# Patient Record
Sex: MALE | Race: WHITE | Employment: OTHER | URBAN - NONMETROPOLITAN AREA
[De-identification: names, ages, dates, MRNs, and addresses within clinical notes are randomized per-mention and may not be internally consistent; named-entity substitution may affect disease eponyms.]

---

## 2017-02-17 RX ORDER — FUROSEMIDE 40 MG/1
TABLET ORAL
Qty: 100 TABLET | Refills: 11 | Status: SHIPPED | OUTPATIENT
Start: 2017-02-17 | End: 2018-02-20 | Stop reason: SDUPTHER

## 2017-12-15 LAB
BASOPHILS ABSOLUTE: ABNORMAL /ΜL
BASOPHILS RELATIVE PERCENT: ABNORMAL %
BUN BLDV-MCNC: 28 MG/DL
CALCIUM SERPL-MCNC: 8.6 MG/DL
CHLORIDE BLD-SCNC: 104 MMOL/L
CHOLESTEROL, TOTAL: 158 MG/DL
CHOLESTEROL/HDL RATIO: NORMAL
CO2: 30 MMOL/L
CREAT SERPL-MCNC: 1.4 MG/DL
EOSINOPHILS ABSOLUTE: ABNORMAL /ΜL
EOSINOPHILS RELATIVE PERCENT: ABNORMAL %
GFR CALCULATED: 52
GLUCOSE BLD-MCNC: 101 MG/DL
HCT VFR BLD CALC: 41.5 % (ref 41–53)
HDLC SERPL-MCNC: 47 MG/DL (ref 35–70)
HEMOGLOBIN: 13.3 G/DL (ref 13.5–17.5)
LDL CHOLESTEROL CALCULATED: 90 MG/DL (ref 0–160)
LYMPHOCYTES ABSOLUTE: ABNORMAL /ΜL
LYMPHOCYTES RELATIVE PERCENT: ABNORMAL %
MCH RBC QN AUTO: ABNORMAL PG
MCHC RBC AUTO-ENTMCNC: ABNORMAL G/DL
MCV RBC AUTO: ABNORMAL FL
MONOCYTES ABSOLUTE: ABNORMAL /ΜL
MONOCYTES RELATIVE PERCENT: ABNORMAL %
NEUTROPHILS ABSOLUTE: ABNORMAL /ΜL
NEUTROPHILS RELATIVE PERCENT: ABNORMAL %
PLATELET # BLD: 260 K/ΜL
PMV BLD AUTO: ABNORMAL FL
POTASSIUM SERPL-SCNC: 4.6 MMOL/L
RBC # BLD: 4.81 10^6/ΜL
SODIUM BLD-SCNC: 138 MMOL/L
TRIGL SERPL-MCNC: 109 MG/DL
VLDLC SERPL CALC-MCNC: 22 MG/DL
WBC # BLD: 7.1 10^3/ML

## 2017-12-26 ENCOUNTER — OFFICE VISIT (OUTPATIENT)
Dept: NEPHROLOGY | Age: 77
End: 2017-12-26
Payer: MEDICARE

## 2017-12-26 VITALS
WEIGHT: 292 LBS | HEART RATE: 48 BPM | HEIGHT: 68 IN | OXYGEN SATURATION: 96 % | BODY MASS INDEX: 44.25 KG/M2 | DIASTOLIC BLOOD PRESSURE: 74 MMHG | RESPIRATION RATE: 18 BRPM | SYSTOLIC BLOOD PRESSURE: 128 MMHG

## 2017-12-26 DIAGNOSIS — N18.30 CKD (CHRONIC KIDNEY DISEASE), STAGE III (HCC): Primary | ICD-10-CM

## 2017-12-26 PROCEDURE — 99213 OFFICE O/P EST LOW 20 MIN: CPT | Performed by: INTERNAL MEDICINE

## 2017-12-26 PROCEDURE — G8484 FLU IMMUNIZE NO ADMIN: HCPCS | Performed by: INTERNAL MEDICINE

## 2017-12-26 PROCEDURE — 1036F TOBACCO NON-USER: CPT | Performed by: INTERNAL MEDICINE

## 2017-12-26 PROCEDURE — 4040F PNEUMOC VAC/ADMIN/RCVD: CPT | Performed by: INTERNAL MEDICINE

## 2017-12-26 PROCEDURE — G8427 DOCREV CUR MEDS BY ELIG CLIN: HCPCS | Performed by: INTERNAL MEDICINE

## 2017-12-26 PROCEDURE — 1123F ACP DISCUSS/DSCN MKR DOCD: CPT | Performed by: INTERNAL MEDICINE

## 2017-12-26 PROCEDURE — G8419 CALC BMI OUT NRM PARAM NOF/U: HCPCS | Performed by: INTERNAL MEDICINE

## 2017-12-26 RX ORDER — QUINAPRIL 40 MG/1
40 TABLET ORAL 2 TIMES DAILY
COMMUNITY

## 2017-12-26 RX ORDER — ATENOLOL 50 MG/1
25 TABLET ORAL DAILY
COMMUNITY

## 2017-12-26 NOTE — PROGRESS NOTES
12/15/2017     BMP:    Lab Results   Component Value Date     12/15/2017     11/28/2016     06/01/2016    K 4.6 12/15/2017    K 4.7 11/28/2016    K 4.2 06/01/2016     12/15/2017     11/28/2016     06/01/2016    CO2 30 12/15/2017    CO2 31 11/28/2016    CO2 31 06/01/2016    BUN 28 12/15/2017    BUN 21 11/28/2016    BUN 24 06/01/2016    CREATININE 1.4 12/15/2017    CREATININE 1.3 11/28/2016    CREATININE 1.3 06/01/2016    GLUCOSE 101 12/15/2017    GLUCOSE 105 11/28/2016    GLUCOSE 104 06/01/2016      Hepatic: No results found for: AST, ALT, ALB, BILITOT, ALKPHOS  BNP: No results found for: BNP  Lipids:   Lab Results   Component Value Date    CHOL 158 12/15/2017    HDL 47 12/15/2017     INR: No results found for: INR  URINE: No results found for: Zoila Guess  Lab Results   Component Value Date    NITRU Negative 12/04/2014    COLORU Cornelia 12/04/2014    PHUR 5.0 12/04/2014    RBCUA 0 12/04/2014    YEAST 0 12/04/2014    BACTERIA 0 12/04/2014    CLARITYU Clear 12/04/2014    LEUKOCYTESUR neg 12/04/2014    BLOODU Negative 12/04/2014    GLUCOSEU neg 12/04/2014    KETUA Negative 12/04/2014      Microalbumen/Creatinine ratio:  No components found for: RUCREAT        Medications:    Current Outpatient Prescriptions   Medication Sig Dispense Refill    atenolol (TENORMIN) 50 MG tablet Take 25 mg by mouth daily      quinapril (ACCUPRIL) 40 MG tablet Take 40 mg by mouth 1 whole in the am 1/2 in the evening      furosemide (LASIX) 40 MG tablet TAKE ONE & ONE-HALF TABLETS BY MOUTH ONCE DAILY 100 tablet 11    clobetasol (TEMOVATE) 0.05 % cream Apply topically 2 times daily Apply topically 2 times daily.  rosuvastatin (CRESTOR) 10 MG tablet Take 10 mg by mouth daily      tamsulosin (FLOMAX) 0.4 MG capsule Take 0.4 mg by mouth daily.  diazepam (VALIUM) 5 MG tablet Take 5 mg by mouth every 6 hours as needed.  fluocinonide (LIDEX) 0.05 % cream Apply  topically 2 times daily. Apply topically 2 times daily.  triamcinolone (KENALOG) 0.1 % cream Apply  topically daily. Apply topically 2 times daily.  alclomethasone (ACLOVATE) 0.05 % cream Apply  topically daily. Apply topically 2 times daily.  Ciclopirox 1 % SHAM Apply  topically. No current facility-administered medications for this visit. IMPRESSIONS:      1. Acute kidney injury from dehydration  2. Chronic kidney disease from HTN and obesity. 3.   HTN  4.   Stage III obesity         PLAN:  1. We discussed the eGFR today. 2. We will continue all current medications without changes. 3. We will see the patient back in 12 months.               _________________________________  Mook Rae.  Nida Arrington, DO  [facility]      CC  Ayde Pineda, CNP

## 2018-11-23 LAB
BASOPHILS ABSOLUTE: NORMAL /ΜL
BASOPHILS RELATIVE PERCENT: NORMAL %
BUN BLDV-MCNC: 20 MG/DL
CALCIUM SERPL-MCNC: 8.9 MG/DL
CHLORIDE BLD-SCNC: 104 MMOL/L
CO2: 28 MMOL/L
CREAT SERPL-MCNC: 1.3 MG/DL
EOSINOPHILS ABSOLUTE: NORMAL /ΜL
EOSINOPHILS RELATIVE PERCENT: NORMAL %
GFR CALCULATED: 57
GLUCOSE BLD-MCNC: 107 MG/DL
HCT VFR BLD CALC: 42.8 % (ref 41–53)
HEMOGLOBIN: 13.8 G/DL (ref 13.5–17.5)
LYMPHOCYTES ABSOLUTE: NORMAL /ΜL
LYMPHOCYTES RELATIVE PERCENT: NORMAL %
MCH RBC QN AUTO: NORMAL PG
MCHC RBC AUTO-ENTMCNC: NORMAL G/DL
MCV RBC AUTO: NORMAL FL
MONOCYTES ABSOLUTE: NORMAL /ΜL
MONOCYTES RELATIVE PERCENT: NORMAL %
NEUTROPHILS ABSOLUTE: NORMAL /ΜL
NEUTROPHILS RELATIVE PERCENT: NORMAL %
PLATELET # BLD: 272 K/ΜL
PMV BLD AUTO: NORMAL FL
POTASSIUM SERPL-SCNC: 4.7 MMOL/L
RBC # BLD: 4.99 10^6/ΜL
SODIUM BLD-SCNC: 140 MMOL/L
WBC # BLD: 8.15 10^3/ML

## 2018-12-11 ENCOUNTER — OFFICE VISIT (OUTPATIENT)
Dept: NEPHROLOGY | Age: 78
End: 2018-12-11
Payer: MEDICARE

## 2018-12-11 VITALS
RESPIRATION RATE: 18 BRPM | WEIGHT: 294 LBS | DIASTOLIC BLOOD PRESSURE: 64 MMHG | BODY MASS INDEX: 44.56 KG/M2 | HEART RATE: 52 BPM | SYSTOLIC BLOOD PRESSURE: 130 MMHG | HEIGHT: 68 IN | OXYGEN SATURATION: 95 %

## 2018-12-11 DIAGNOSIS — E88.81 METABOLIC SYNDROME: ICD-10-CM

## 2018-12-11 DIAGNOSIS — N18.30 CKD (CHRONIC KIDNEY DISEASE), STAGE III (HCC): Primary | ICD-10-CM

## 2018-12-11 PROCEDURE — G8417 CALC BMI ABV UP PARAM F/U: HCPCS | Performed by: INTERNAL MEDICINE

## 2018-12-11 PROCEDURE — 4040F PNEUMOC VAC/ADMIN/RCVD: CPT | Performed by: INTERNAL MEDICINE

## 2018-12-11 PROCEDURE — 1101F PT FALLS ASSESS-DOCD LE1/YR: CPT | Performed by: INTERNAL MEDICINE

## 2018-12-11 PROCEDURE — 1036F TOBACCO NON-USER: CPT | Performed by: INTERNAL MEDICINE

## 2018-12-11 PROCEDURE — G8427 DOCREV CUR MEDS BY ELIG CLIN: HCPCS | Performed by: INTERNAL MEDICINE

## 2018-12-11 PROCEDURE — 1123F ACP DISCUSS/DSCN MKR DOCD: CPT | Performed by: INTERNAL MEDICINE

## 2018-12-11 PROCEDURE — 99213 OFFICE O/P EST LOW 20 MIN: CPT | Performed by: INTERNAL MEDICINE

## 2018-12-11 PROCEDURE — G8484 FLU IMMUNIZE NO ADMIN: HCPCS | Performed by: INTERNAL MEDICINE

## 2019-03-28 RX ORDER — FUROSEMIDE 40 MG/1
TABLET ORAL
Qty: 100 TABLET | Refills: 11 | Status: SHIPPED | OUTPATIENT
Start: 2019-03-28 | End: 2020-04-28

## 2019-06-03 ENCOUNTER — TELEPHONE (OUTPATIENT)
Dept: NEPHROLOGY | Age: 79
End: 2019-06-03

## 2019-06-03 NOTE — TELEPHONE ENCOUNTER
Patient calling with concerns of taking amoxicillin 875-125 mg po bid x 10 days which was prescribed for an ear/bladder infection   Please advise

## 2019-11-19 LAB
BASOPHILS ABSOLUTE: NORMAL
BASOPHILS RELATIVE PERCENT: NORMAL
BUN BLDV-MCNC: 21 MG/DL
CALCIUM SERPL-MCNC: 9 MG/DL
CHLORIDE BLD-SCNC: 103 MMOL/L
CO2: 29 MMOL/L
CREAT SERPL-MCNC: 1.5 MG/DL
EOSINOPHILS ABSOLUTE: NORMAL
EOSINOPHILS RELATIVE PERCENT: NORMAL
GFR CALCULATED: 48
GLUCOSE BLD-MCNC: 95 MG/DL
HCT VFR BLD CALC: 43.4 % (ref 41–53)
HEMOGLOBIN: 13.8 G/DL (ref 13.5–17.5)
LYMPHOCYTES ABSOLUTE: NORMAL
LYMPHOCYTES RELATIVE PERCENT: NORMAL
MCH RBC QN AUTO: NORMAL PG
MCHC RBC AUTO-ENTMCNC: NORMAL G/DL
MCV RBC AUTO: NORMAL FL
MONOCYTES ABSOLUTE: NORMAL
MONOCYTES RELATIVE PERCENT: NORMAL
NEUTROPHILS ABSOLUTE: NORMAL
NEUTROPHILS RELATIVE PERCENT: NORMAL
PLATELET # BLD: 254 K/ΜL
PMV BLD AUTO: NORMAL FL
POTASSIUM SERPL-SCNC: 4.2 MMOL/L
RBC # BLD: 5.09 10^6/ΜL
SODIUM BLD-SCNC: 141 MMOL/L
WBC # BLD: 7.05 10^3/ML

## 2019-12-10 ENCOUNTER — OFFICE VISIT (OUTPATIENT)
Dept: NEPHROLOGY | Age: 79
End: 2019-12-10
Payer: MEDICARE

## 2019-12-10 VITALS
DIASTOLIC BLOOD PRESSURE: 64 MMHG | SYSTOLIC BLOOD PRESSURE: 112 MMHG | RESPIRATION RATE: 18 BRPM | HEIGHT: 68 IN | WEIGHT: 283 LBS | OXYGEN SATURATION: 95 % | HEART RATE: 57 BPM | BODY MASS INDEX: 42.89 KG/M2

## 2019-12-10 DIAGNOSIS — N18.30 CKD (CHRONIC KIDNEY DISEASE), STAGE III (HCC): Primary | ICD-10-CM

## 2019-12-10 PROCEDURE — G8427 DOCREV CUR MEDS BY ELIG CLIN: HCPCS | Performed by: INTERNAL MEDICINE

## 2019-12-10 PROCEDURE — 1123F ACP DISCUSS/DSCN MKR DOCD: CPT | Performed by: INTERNAL MEDICINE

## 2019-12-10 PROCEDURE — 99213 OFFICE O/P EST LOW 20 MIN: CPT | Performed by: INTERNAL MEDICINE

## 2019-12-10 PROCEDURE — 1036F TOBACCO NON-USER: CPT | Performed by: INTERNAL MEDICINE

## 2019-12-10 PROCEDURE — G8484 FLU IMMUNIZE NO ADMIN: HCPCS | Performed by: INTERNAL MEDICINE

## 2019-12-10 PROCEDURE — G8417 CALC BMI ABV UP PARAM F/U: HCPCS | Performed by: INTERNAL MEDICINE

## 2019-12-10 PROCEDURE — 4040F PNEUMOC VAC/ADMIN/RCVD: CPT | Performed by: INTERNAL MEDICINE

## 2020-04-29 RX ORDER — FUROSEMIDE 40 MG/1
TABLET ORAL
Qty: 100 TABLET | Refills: 0 | Status: SHIPPED | OUTPATIENT
Start: 2020-04-29 | End: 2020-06-09 | Stop reason: ALTCHOICE

## 2020-05-19 LAB
BASOPHILS ABSOLUTE: NORMAL
BASOPHILS RELATIVE PERCENT: NORMAL
BUN BLDV-MCNC: 19 MG/DL
CALCIUM SERPL-MCNC: 8.9 MG/DL
CHLORIDE BLD-SCNC: 103 MMOL/L
CO2: 32 MMOL/L
CREAT SERPL-MCNC: 1.3 MG/DL
EOSINOPHILS ABSOLUTE: NORMAL
EOSINOPHILS RELATIVE PERCENT: NORMAL
GFR CALCULATED: 57
GLUCOSE BLD-MCNC: 110 MG/DL
HCT VFR BLD CALC: 44.5 % (ref 41–53)
HEMOGLOBIN: 13.9 G/DL (ref 13.5–17.5)
LYMPHOCYTES ABSOLUTE: NORMAL
LYMPHOCYTES RELATIVE PERCENT: NORMAL
MCH RBC QN AUTO: NORMAL PG
MCHC RBC AUTO-ENTMCNC: NORMAL G/DL
MCV RBC AUTO: NORMAL FL
MONOCYTES ABSOLUTE: NORMAL
MONOCYTES RELATIVE PERCENT: NORMAL
NEUTROPHILS ABSOLUTE: NORMAL
NEUTROPHILS RELATIVE PERCENT: NORMAL
PLATELET # BLD: 279 K/ΜL
PMV BLD AUTO: NORMAL FL
POTASSIUM SERPL-SCNC: 3.9 MMOL/L
RBC # BLD: 5.19 10^6/ΜL
SODIUM BLD-SCNC: 142 MMOL/L
WBC # BLD: 8.15 10^3/ML

## 2020-06-09 ENCOUNTER — OFFICE VISIT (OUTPATIENT)
Dept: NEPHROLOGY | Age: 80
End: 2020-06-09
Payer: MEDICARE

## 2020-06-09 VITALS
TEMPERATURE: 96.6 F | HEART RATE: 58 BPM | BODY MASS INDEX: 44.41 KG/M2 | HEIGHT: 68 IN | DIASTOLIC BLOOD PRESSURE: 58 MMHG | OXYGEN SATURATION: 94 % | SYSTOLIC BLOOD PRESSURE: 138 MMHG | WEIGHT: 293 LBS

## 2020-06-09 PROCEDURE — G8417 CALC BMI ABV UP PARAM F/U: HCPCS | Performed by: INTERNAL MEDICINE

## 2020-06-09 PROCEDURE — G8427 DOCREV CUR MEDS BY ELIG CLIN: HCPCS | Performed by: INTERNAL MEDICINE

## 2020-06-09 PROCEDURE — 99213 OFFICE O/P EST LOW 20 MIN: CPT | Performed by: INTERNAL MEDICINE

## 2020-06-09 PROCEDURE — 1123F ACP DISCUSS/DSCN MKR DOCD: CPT | Performed by: INTERNAL MEDICINE

## 2020-06-09 PROCEDURE — 1036F TOBACCO NON-USER: CPT | Performed by: INTERNAL MEDICINE

## 2020-06-09 PROCEDURE — 4040F PNEUMOC VAC/ADMIN/RCVD: CPT | Performed by: INTERNAL MEDICINE

## 2020-06-09 RX ORDER — BUMETANIDE 2 MG/1
2 TABLET ORAL DAILY
Qty: 90 TABLET | Refills: 3 | Status: SHIPPED | OUTPATIENT
Start: 2020-06-09 | End: 2021-06-25

## 2020-06-09 NOTE — PATIENT INSTRUCTIONS
KNOW YOUR KIDNEY NUMBERS    Your kidney speed (eGFR) was 57 ml/min this visit (normal is  ml/min)(Ml/min=milliliters of blood filtered per minute). The higher this number is, the better your kidney function is. Your serum creatinine was 1.3 (normal 0.8-1.2 mg/dl at most labs). The higher this number is, the worse your kidney function is. You are in stage G-IIIA-A1 of chronic kidney disease. Your kidney function has improved as compared to your last visit. Stages of kidney disease    EGFR (estimated glomerular filtration rate)    G-I > 90 ml/min  G-II 60-89 ml/min  G-IIIA 45-59 ml/min  G-IIIB 30-44 ml/min  G-IV 15-29 ml/min  G-V < 15 mlmin    ( may need dialysis)    ACR (urine albumin/creatinine ratio)    A-1       ACR<3  A-2 ACR 3-30  A-3 ACR >30    Our goal is to keep your eGFR going as fast as possible ( ml/min is normal). If your eGFR declines to 15-24 ml/min and stays there without recovery,  we will begin to educate you about dialysis or kidney transplant. The leading cause of kidney disease in the world is diabetes mellitus. Keep your sugar  as much as possible. The second leading cause is hypertension. Keep Your blood pressure 120-140/70-80 as much as possible. If you need refills, call the office or your drug store. You may call the office any time with any questions or concerns. DUE TO THE CORONAVIRUS CONCERN, PLEASE LIMIT YOUR TIME IN PUBLIC. 8 [address] YOUR HANDS COMPLETELY AND FREQUENTLY. Prakash Harry

## 2020-06-09 NOTE — PROGRESS NOTES
[facility]    [address]  [address], [address]  [phone]       Progress Note    6/9/2020 11:04 AM    Pt Name:    Toya Good  YOB: 1940  Primary Care Physician:  Maine Mayo, LUCY - CNP       Chief Complaint:   Chief Complaint   Patient presents with    Chronic Kidney Disease    Hypertension        History of Chief Complaint: CKD stage G-IIIA-A1 from HTN (wide pulse pressure) and bladder outlet obstruction from prostatic hypertrophy and prostatitis. Subjective:  I last saw the patient in clinic 12/10/19. I follow the patient for Chronic Kidney disease stage G-IIIA-A1. Since our last visit the patient has not been hospitalized. The patient is sleeping well at night with 1-2 times per night nocturia. The patient has a good appetite and is remaining active. The patient denied N/V/C/D/SOB/CP. He has trouble at bladder emptying. He has prostatitis and takes occasional antibiotics for it. He presented his blood pressure diary today. He has wide pulse pressure hypertension. Last six eGFR readings:  Lab Results   Component Value Date    LABGLOM 57 05/19/2020    LABGLOM 48 11/19/2019    LABGLOM 57 11/23/2018    LABGLOM 52 12/15/2017    LABGLOM 57 11/28/2016    LABGLOM 57 06/01/2016          Objective:  VITALS:  BP (!) 138/58 (Site: Left Upper Arm, Position: Sitting, Cuff Size: Large Adult)   Pulse 58   Temp 96.6 °F (35.9 °C)   Ht 5' 8\" (1.727 m)   Wt 293 lb (132.9 kg)   SpO2 94%   BMI 44.55 kg/m²   Weight:   Wt Readings from Last 3 Encounters:   06/09/20 293 lb (132.9 kg)   12/10/19 283 lb (128.4 kg)   12/11/18 294 lb (133.4 kg)     Body mass index is 44.55 kg/m². Physical examination    General:  Alert and cooperative with exam  HEENT:  Normocephalic. No lesions nor rashes. MOY. EOMI  Neck:   No JVD and no bruits. Thyroid gland is normal  Lungs:  Breathing easily. No rales nor rhonchi. No cough nor sputum production.   Heart[de-identified] (TENORMIN) 50 MG tablet Take 25 mg by mouth daily      quinapril (ACCUPRIL) 40 MG tablet Take 40 mg by mouth 1 whole in the am 1/2 in the evening      clobetasol (TEMOVATE) 0.05 % cream Apply topically 2 times daily Apply topically 2 times daily.  rosuvastatin (CRESTOR) 10 MG tablet Take 10 mg by mouth daily      tamsulosin (FLOMAX) 0.4 MG capsule Take 0.4 mg by mouth daily.  diazepam (VALIUM) 5 MG tablet Take 5 mg by mouth every 6 hours as needed.  fluocinonide (LIDEX) 0.05 % cream Apply  topically 2 times daily. Apply topically 2 times daily.  triamcinolone (KENALOG) 0.1 % cream Apply  topically daily. Apply topically 2 times daily.  alclomethasone (ACLOVATE) 0.05 % cream Apply  topically daily. Apply topically 2 times daily.  Ciclopirox 1 % SHAM Apply  topically. No current facility-administered medications for this visit. IMPRESSIONS:      Kidney disease: CKD stage G-IIIA-A1  Anemia: Anemia remains stable. No need for an erythrocyte stimulating agent (ALMA). Bone and mineral metabolism: He has no bone pain. PLAN:  1. We discussed the eGFR today. 2. We will continue all current medications without changes. 3. People with wide pulse pressure hypertension respond best to beta blockade or diuretics. His pulse is already low. 4. Stop lasix (using generic that is not as good)  5. Adding bumex 2 mg oral daily. 6. We will see the patient back in 6 months.               _________________________________  Darrion Washburn.  Laine Salcedo, DO  [facility]      CC  Max Ohs, APRN - CNP

## 2020-12-28 LAB
BASOPHILS ABSOLUTE: NORMAL
BASOPHILS RELATIVE PERCENT: NORMAL
BUN BLDV-MCNC: 29 MG/DL
CALCIUM SERPL-MCNC: 8.9 MG/DL
CHLORIDE BLD-SCNC: 106 MMOL/L
CO2: 32 MMOL/L
CREAT SERPL-MCNC: 1.3 MG/DL
EOSINOPHILS ABSOLUTE: NORMAL
EOSINOPHILS RELATIVE PERCENT: NORMAL
GFR CALCULATED: 57
GLUCOSE BLD-MCNC: 112 MG/DL
HCT VFR BLD CALC: 44.6 % (ref 41–53)
HEMOGLOBIN: 14.2 G/DL (ref 13.5–17.5)
LYMPHOCYTES ABSOLUTE: NORMAL
LYMPHOCYTES RELATIVE PERCENT: NORMAL
MCH RBC QN AUTO: NORMAL PG
MCHC RBC AUTO-ENTMCNC: NORMAL G/DL
MCV RBC AUTO: NORMAL FL
MONOCYTES ABSOLUTE: NORMAL
MONOCYTES RELATIVE PERCENT: NORMAL
NEUTROPHILS ABSOLUTE: NORMAL
NEUTROPHILS RELATIVE PERCENT: NORMAL
PLATELET # BLD: 292 K/ΜL
PMV BLD AUTO: NORMAL FL
POTASSIUM SERPL-SCNC: 4.2 MMOL/L
RBC # BLD: 5.08 10^6/ΜL
SODIUM BLD-SCNC: 145 MMOL/L
WBC # BLD: 7.38 10^3/ML

## 2021-01-07 PROBLEM — E66.3 OVERWEIGHT: Status: ACTIVE | Noted: 2017-07-03

## 2021-01-07 PROBLEM — G47.10 HYPERSOMNIA: Status: ACTIVE | Noted: 2017-07-03

## 2021-01-08 ENCOUNTER — OFFICE VISIT (OUTPATIENT)
Dept: NEPHROLOGY | Age: 81
End: 2021-01-08
Payer: MEDICARE

## 2021-01-08 VITALS
BODY MASS INDEX: 43.04 KG/M2 | WEIGHT: 284 LBS | HEART RATE: 65 BPM | DIASTOLIC BLOOD PRESSURE: 90 MMHG | HEIGHT: 68 IN | TEMPERATURE: 97.3 F | OXYGEN SATURATION: 95 % | SYSTOLIC BLOOD PRESSURE: 164 MMHG | RESPIRATION RATE: 18 BRPM

## 2021-01-08 DIAGNOSIS — N18.31 STAGE 3A CHRONIC KIDNEY DISEASE (HCC): Primary | ICD-10-CM

## 2021-01-08 PROCEDURE — G8484 FLU IMMUNIZE NO ADMIN: HCPCS | Performed by: INTERNAL MEDICINE

## 2021-01-08 PROCEDURE — 1123F ACP DISCUSS/DSCN MKR DOCD: CPT | Performed by: INTERNAL MEDICINE

## 2021-01-08 PROCEDURE — 99214 OFFICE O/P EST MOD 30 MIN: CPT | Performed by: INTERNAL MEDICINE

## 2021-01-08 PROCEDURE — 1036F TOBACCO NON-USER: CPT | Performed by: INTERNAL MEDICINE

## 2021-01-08 PROCEDURE — G8427 DOCREV CUR MEDS BY ELIG CLIN: HCPCS | Performed by: INTERNAL MEDICINE

## 2021-01-08 PROCEDURE — G8417 CALC BMI ABV UP PARAM F/U: HCPCS | Performed by: INTERNAL MEDICINE

## 2021-01-08 PROCEDURE — 4040F PNEUMOC VAC/ADMIN/RCVD: CPT | Performed by: INTERNAL MEDICINE

## 2021-01-08 NOTE — PATIENT INSTRUCTIONS
KNOW YOUR KIDNEY NUMBERS    Your kidney speed (eGFR) was 57 ml/min this visit (normal is  ml/min)(Ml/min=milliliters of blood filtered per minute). The higher this number is, the better your kidney function is. Your serum creatinine was 1.3 (normal 0.8-1.2 mg/dl at most labs). The higher this number is, the worse your kidney function is. You are in stage G-IIIA-A1 of chronic kidney disease. Your kidney function has remained stable as compared to your last visit. Your last eGFR was  57 Ml/Min. Stages of kidney disease    EGFR (estimated glomerular filtration rate)    G-I > 90 ml/min Kidney damage with normal kidney function (blood or protein in the urine)  G-II 60-89 ml/min Normal kidney function with mild damage with or without blood or protein in the urine  G-IIIA 45-59 ml/min Mild to moderate loss of kidney function. G-IIIB 30-44 ml/min Moderate to severe loss of kidney function  G-IV 15-29 ml/min Severe loss of kidney function  G-V < 15 mlmin     May need dialysis or kidney transplant    ACR (urine albumin/creatinine ratio) (Mg/Gm)    A-1      ACR<30 Normal to mildly increased protein in the urine. A-2 ACR  Moderate increase in urine protein loss. A-3 ACR >300 Severe increase in urine protein loss    Our goal is to keep your eGFR going as fast as possible ( ml/min is normal). If your eGFR declines to 15-24 ml/min and stays there without recovery,  we will begin to educate you about dialysis or kidney transplant. We also want to keep the protein in your urine as low as possible. The leading cause of kidney disease in the world is diabetes mellitus. Keep your sugar  as much as possible. The second leading cause is hypertension. Keep Your blood pressure 120-140/70-80 as much as possible. If you need refills, call the office or your drug store. You may call the office any time with any questions or concerns. DUE TO THE CORONAVIRUS CONCERN, PLEASE LIMIT YOUR TIME IN PUBLIC. 8 [address] Song[address] YOUR HANDS COMPLETELY AND FREQUENTLY. Marcos Lechuga

## 2021-01-08 NOTE — PROGRESS NOTES
[facility]    [address]  [address], [address]  [phone]       Progress Note    1/8/2021 2:07 PM    Pt Name:    Julio Santoyo  YOB: 1940  Primary Care Physician:  LUCY Salas - CNP       Chief Complaint:   Chief Complaint   Patient presents with    Chronic Kidney Disease    Hypertension    Benign Prostatic Hypertrophy    Nephropathy    Obesity    Proteinuria        History of Chief Complaint: CKD stage G-IIIA-A1 from HTN (wide pulse pressure) and bladder outlet obstruction from prostatic hypertrophy and prostatitis. Subjective:  I last saw the patient in clinic 06/09/2020. I follow the patient for Chronic Kidney disease stage G-IIIA-A1. Since our last visit the patient has not been hospitalized. The patient is sleeping well at night with 2-3 times per night nocturia. The patient has a good appetite and is remaining active. The patient denied N/V/C/D/SOB/CP. He has no trouble at bladder emptying. His last bought of prostatitis was October 2020. COVID-19 screening  Fever: none  Cough: none  Exposure: none  Shortness of breath: none    Protein/creatinine ratio:   eGFR: 57 Ml/min      Last six eGFR readings:  Lab Results   Component Value Date    LABGLOM 57 12/28/2020    LABGLOM 57 05/19/2020    LABGLOM 48 11/19/2019    LABGLOM 57 11/23/2018    LABGLOM 52 12/15/2017    LABGLOM 57 11/28/2016          Objective:  VITALS:  BP (!) 164/90 (Site: Left Upper Arm, Position: Sitting, Cuff Size: Small Adult)   Pulse 65   Temp 97.3 °F (36.3 °C)   Resp 18   Ht 5' 8\" (1.727 m)   Wt 284 lb (128.8 kg)   SpO2 95%   BMI 43.18 kg/m²   Weight:   Wt Readings from Last 3 Encounters:   01/08/21 284 lb (128.8 kg)   06/09/20 293 lb (132.9 kg)   12/10/19 283 lb (128.4 kg)     Body mass index is 43.18 kg/m². Physical examination    General:  Alert and cooperative with exam  HEENT:  Normocephalic. No lesions nor rashes. MOY.  EOMI Neck:   No JVD and no bruits. Thyroid gland is normal  Lungs:  Breathing easily. No rales nor rhonchi. No cough nor sputum production. Heart[de-identified]            RRR. No murmurs nor rubs. PMI is not enlarged nor displaced. Abdomen:  Soft and non tender. Bowel sounds are active in all four quadrants. Extremities:  2+ edema  Neurologic:  CN II-XII are intact. No deficits noted. Muscle strength and tone are equal throughout. Skin:                Warm and dry with no rashes. Muscles:         Hand  and leg strength are equal and strong bilaterally.      Lab Data      CBC:   Lab Results   Component Value Date    WBC 7.38 12/28/2020    HGB 14.2 12/28/2020    HCT 44.6 12/28/2020     12/28/2020     BMP:    Lab Results   Component Value Date     12/28/2020     05/19/2020     11/19/2019    K 4.2 12/28/2020    K 3.9 05/19/2020    K 4.2 11/19/2019     12/28/2020     05/19/2020     11/19/2019    CO2 32 12/28/2020    CO2 32 05/19/2020    CO2 29 11/19/2019    BUN 29 12/28/2020    BUN 19 05/19/2020    BUN 21 11/19/2019    CREATININE 1.3 12/28/2020    CREATININE 1.3 05/19/2020    CREATININE 1.5 11/19/2019    GLUCOSE 112 12/28/2020    GLUCOSE 110 05/19/2020    GLUCOSE 95 11/19/2019      Hepatic: No results found for: AST, ALT, ALB, BILITOT, ALKPHOS  BNP: No results found for: BNP  Lipids:   Lab Results   Component Value Date    CHOL 158 12/15/2017    HDL 47 12/15/2017     INR: No results found for: INR  URINE: No results found for: NAUR, PROTUR  Lab Results   Component Value Date    NITRU Negative 12/04/2014    COLORU Cornelia 12/04/2014    PHUR 5.0 12/04/2014    RBCUA 0 12/04/2014    YEAST 0 12/04/2014    BACTERIA 0 12/04/2014    CLARITYU Clear 12/04/2014    LEUKOCYTESUR neg 12/04/2014    BLOODU Negative 12/04/2014    GLUCOSEU neg 12/04/2014    KETUA Negative 12/04/2014      Microalbumen/Creatinine ratio:  No components found for: RUCREAT        Medications: Current Outpatient Medications   Medication Sig Dispense Refill    bumetanide (BUMEX) 2 MG tablet Take 1 tablet by mouth daily 90 tablet 3    atenolol (TENORMIN) 50 MG tablet Take 25 mg by mouth daily      quinapril (ACCUPRIL) 40 MG tablet Take 40 mg by mouth 1 whole in the am 1/2 in the evening      clobetasol (TEMOVATE) 0.05 % cream Apply topically 2 times daily Apply topically 2 times daily.  rosuvastatin (CRESTOR) 10 MG tablet Take 10 mg by mouth daily      tamsulosin (FLOMAX) 0.4 MG capsule Take 0.4 mg by mouth daily.  diazepam (VALIUM) 5 MG tablet Take 5 mg by mouth every 6 hours as needed.  fluocinonide (LIDEX) 0.05 % cream Apply  topically 2 times daily. Apply topically 2 times daily.  triamcinolone (KENALOG) 0.1 % cream Apply  topically daily. Apply topically 2 times daily.  alclomethasone (ACLOVATE) 0.05 % cream Apply  topically daily. Apply topically 2 times daily.  Ciclopirox 1 % SHAM Apply  topically. No current facility-administered medications for this visit. IMPRESSIONS:      Kidney disease: CKD stage G-IIIA-A1  Anemia: Anemia remains stable. No need for an erythrocyte stimulating agent (ALMA). Bone and mineral metabolism: There is no complaint of bone pain. PLAN:  1. We discussed the eGFR today. 2. We will continue all current medications without changes. 3. Checking protein/creatinine ratio. 4. We will see the patient back in 6 months.       _________________________________  Amador García.  Herbert Payer,   [facility]      CC  Yusuf Escudero, APRN - CNP

## 2021-06-25 RX ORDER — BUMETANIDE 2 MG/1
TABLET ORAL
Qty: 90 TABLET | Refills: 3 | Status: SHIPPED | OUTPATIENT
Start: 2021-06-25 | End: 2022-05-10 | Stop reason: SDUPTHER

## 2021-07-06 LAB
BASOPHILS ABSOLUTE: ABNORMAL
BASOPHILS RELATIVE PERCENT: ABNORMAL
BUN BLDV-MCNC: 28 MG/DL
CALCIUM SERPL-MCNC: 8.6 MG/DL
CHLORIDE BLD-SCNC: 102 MMOL/L
CO2: 31 MMOL/L
CREAT SERPL-MCNC: 1.6 MG/DL
EOSINOPHILS ABSOLUTE: ABNORMAL
EOSINOPHILS RELATIVE PERCENT: ABNORMAL
GFR CALCULATED: 45
GLUCOSE BLD-MCNC: 93 MG/DL
HCT VFR BLD CALC: 40.9 % (ref 41–53)
HEMOGLOBIN: 13.1 G/DL (ref 13.5–17.5)
LYMPHOCYTES ABSOLUTE: ABNORMAL
LYMPHOCYTES RELATIVE PERCENT: ABNORMAL
MCH RBC QN AUTO: ABNORMAL PG
MCHC RBC AUTO-ENTMCNC: ABNORMAL G/DL
MCV RBC AUTO: ABNORMAL FL
MONOCYTES ABSOLUTE: ABNORMAL
MONOCYTES RELATIVE PERCENT: ABNORMAL
NEUTROPHILS ABSOLUTE: ABNORMAL
NEUTROPHILS RELATIVE PERCENT: ABNORMAL
PLATELET # BLD: 286 K/ΜL
PMV BLD AUTO: ABNORMAL FL
POTASSIUM SERPL-SCNC: 4.7 MMOL/L
RBC # BLD: 4.75 10^6/ΜL
SODIUM BLD-SCNC: 139 MMOL/L
WBC # BLD: 7.24 10^3/ML

## 2021-07-12 PROBLEM — R00.1 BRADYCARDIA: Status: ACTIVE | Noted: 2021-07-12

## 2021-07-12 PROBLEM — N40.0 ENLARGED PROSTATE: Status: ACTIVE | Noted: 2021-07-12

## 2021-07-12 PROBLEM — R73.01 IMPAIRED FASTING GLUCOSE: Status: ACTIVE | Noted: 2021-07-12

## 2021-07-12 PROBLEM — I87.2 PERIPHERAL VENOUS INSUFFICIENCY: Status: ACTIVE | Noted: 2021-07-12

## 2021-07-12 PROBLEM — Z96.659 HISTORY OF TOTAL KNEE ARTHROPLASTY: Status: ACTIVE | Noted: 2021-07-12

## 2021-07-12 PROBLEM — R60.9 PERIPHERAL EDEMA: Status: ACTIVE | Noted: 2021-07-12

## 2021-07-12 PROBLEM — S39.012A LOW BACK STRAIN: Status: ACTIVE | Noted: 2021-07-12
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
## 2021-07-12 PROBLEM — J31.0 RHINITIS: Status: ACTIVE | Noted: 2021-07-12
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
## 2021-07-12 PROBLEM — N40.0 HYPERTROPHY OF PROSTATE WITHOUT URINARY OBSTRUCTION AND OTHER LOWER URINARY TRACT SYMPTOMS (LUTS): Status: ACTIVE | Noted: 2021-07-12

## 2021-07-12 PROBLEM — F51.01 PRIMARY INSOMNIA: Status: ACTIVE | Noted: 2021-07-12

## 2021-07-12 PROBLEM — M17.10 ARTHRITIS OF KNEE: Status: ACTIVE | Noted: 2021-07-12

## 2021-07-12 PROBLEM — M19.90 LOCALIZED OSTEOARTHROSIS: Status: ACTIVE | Noted: 2021-07-12

## 2021-07-12 PROBLEM — R25.2 CRAMPS OF LOWER EXTREMITY: Status: ACTIVE | Noted: 2021-07-12

## 2021-07-12 PROBLEM — K81.9 CHOLECYSTITIS: Status: ACTIVE | Noted: 2021-07-12

## 2021-07-12 PROBLEM — R60.0 PERIPHERAL EDEMA: Status: ACTIVE | Noted: 2021-07-12

## 2021-07-12 PROBLEM — N41.9 INFLAMMATORY DISEASE OF PROSTATE, UNSPECIFIED: Status: ACTIVE | Noted: 2021-07-12

## 2021-07-13 ENCOUNTER — OFFICE VISIT (OUTPATIENT)
Dept: NEPHROLOGY | Age: 81
End: 2021-07-13
Payer: MEDICARE

## 2021-07-13 VITALS
HEIGHT: 68 IN | RESPIRATION RATE: 18 BRPM | DIASTOLIC BLOOD PRESSURE: 64 MMHG | WEIGHT: 268 LBS | HEART RATE: 62 BPM | SYSTOLIC BLOOD PRESSURE: 112 MMHG | BODY MASS INDEX: 40.62 KG/M2 | OXYGEN SATURATION: 96 %

## 2021-07-13 DIAGNOSIS — N40.2 PROSTATIC NODULE: ICD-10-CM

## 2021-07-13 DIAGNOSIS — N18.32 STAGE 3B CHRONIC KIDNEY DISEASE (HCC): Primary | ICD-10-CM

## 2021-07-13 DIAGNOSIS — N41.1 PROSTATITIS, CHRONIC: ICD-10-CM

## 2021-07-13 DIAGNOSIS — N40.1 BENIGN PROSTATIC HYPERPLASIA WITH LOWER URINARY TRACT SYMPTOMS, SYMPTOM DETAILS UNSPECIFIED: ICD-10-CM

## 2021-07-13 PROCEDURE — 1123F ACP DISCUSS/DSCN MKR DOCD: CPT | Performed by: INTERNAL MEDICINE

## 2021-07-13 PROCEDURE — G8417 CALC BMI ABV UP PARAM F/U: HCPCS | Performed by: INTERNAL MEDICINE

## 2021-07-13 PROCEDURE — 99214 OFFICE O/P EST MOD 30 MIN: CPT | Performed by: INTERNAL MEDICINE

## 2021-07-13 PROCEDURE — 4040F PNEUMOC VAC/ADMIN/RCVD: CPT | Performed by: INTERNAL MEDICINE

## 2021-07-13 PROCEDURE — G8427 DOCREV CUR MEDS BY ELIG CLIN: HCPCS | Performed by: INTERNAL MEDICINE

## 2021-07-13 PROCEDURE — 1036F TOBACCO NON-USER: CPT | Performed by: INTERNAL MEDICINE

## 2021-07-13 NOTE — PATIENT INSTRUCTIONS
KNOW YOUR KIDNEY NUMBERS    Your kidney speed (eGFR) was 45 ml/min this visit (normal is  ml/min)(Ml/min=milliliters of blood filtered per minute). The higher this number is, the better your kidney function is. Your serum creatinine was 1.6 (normal 0.8-1.2 mg/dl at most labs). The higher this number is, the worse your kidney function is. You are in stage G-IIIB-A1 of chronic kidney disease. Your kidney function has declined as compared to your last visit. Your last eGFR was  57 Ml/Min. Stages of kidney disease    EGFR (estimated glomerular filtration rate)    G-I > 90 ml/min Kidney damage with normal kidney function (blood or protein in the urine)  G-II 60-89 ml/min Normal kidney function with mild damage with or without blood or protein in the urine  G-IIIA 45-59 ml/min Mild to moderate loss of kidney function. G-IIIB 30-44 ml/min Moderate to severe loss of kidney function  G-IV 15-29 ml/min Severe loss of kidney function  G-V < 15 mlmin     May need dialysis or kidney transplant    ACR (urine albumin/creatinine ratio) (Mg/Gm)    A-1      ACR<30 Normal to mildly increased protein in the urine. A-2 ACR  Moderate increase in urine protein loss. A-3 ACR >300 Severe increase in urine protein loss    Our goal is to keep your eGFR going as fast as possible ( ml/min is normal). If your eGFR declines to 15-24 ml/min and stays there without recovery,  we will begin to educate you about dialysis or kidney transplant. We also want to keep the protein in your urine as low as possible. The leading cause of kidney disease in the world is diabetes mellitus. Keep your sugar  as much as possible. The second leading cause is hypertension. Keep Your blood pressure 120-140/70-80 as much as possible. If you need refills, call the office or your drug store. You may call the office any time with any questions or concerns.     DUE TO THE CORONAVIRUS CONCERN, PLEASE LIMIT YOUR TIME IN PUBLIC. [address] YOUR HANDS COMPLETELY AND FREQUENTLY. Tia Farley

## 2021-07-13 NOTE — PROGRESS NOTES
[facility]    [address]  [address], [address]  [phone]       Progress Note    7/13/2021 2:04 PM    Pt Name:    Rosa Barajas  YOB: 1940  Primary Care Physician:  Pam Huang, APRN - CNP       Chief Complaint:   Chief Complaint   Patient presents with    Chronic Kidney Disease    Benign Prostatic Hypertrophy    Hypertension    Obesity    Nephropathy    Other     prostatitis    Proteinuria        History of Chief Complaint: CKD stage G-IIIA-A1 from HTN (wide pulse pressure) and bladder outlet obstruction from prostatic hypertrophy and prostatitis. Subjective:  I last saw the patient in clinic 01/08/2021. I follow the patient for Chronic Kidney disease stage G-IIIB-A1. Since our last visit the patient has not been hospitalized. The patient is sleeping well at night with 1-2 times per night nocturia. The patient has a good appetite and is remaining active. The patient denied N/V/C/D/SOB/CP. He has no trouble at bladder emptying. He has chronic prostatitis. He gets antibiotics for it. His wife has dementia and the family is facing bankruptcy in order to place her in a nursing home.      COVID-19 screening  Fever: none  Cough: none  Exposure: none  Shortness of breath: none    Micro albumin/creatinine ratio:   eGFR: 45 Ml/min (it was 57 Ml/Min last visit)  SCr: 1.6 Mg/Dl (It was 1.4 Mg/Dl last visit)      Last six eGFR readings:  Lab Results   Component Value Date    LABGLOM 45 07/06/2021    LABGLOM 57 12/28/2020    LABGLOM 57 05/19/2020    LABGLOM 48 11/19/2019    LABGLOM 57 11/23/2018    LABGLOM 52 12/15/2017          Objective:  VITALS:  /64 (Site: Right Upper Arm, Position: Sitting, Cuff Size: Large Adult)   Pulse 62   Resp 18   Ht 5' 8\" (1.727 m)   Wt 268 lb (121.6 kg)   SpO2 96%   BMI 40.75 kg/m²   Weight:   Wt Readings from Last 3 Encounters:   07/13/21 268 lb (121.6 kg)   01/08/21 284 lb (128.8 kg)   06/09/20 293 lb (132.9 kg) Body mass index is 40.75 kg/m². Physical examination    General:  Alert and cooperative with exam  HEENT:  Normocephalic. No lesions nor rashes. MOY. EOMI  Neck:   No JVD and no bruits. Thyroid gland is normal  Lungs:  Breathing easily. No rales nor rhonchi. No cough nor sputum production. Heart[de-identified]            RRR. No murmurs nor rubs. PMI is not enlarged nor displaced. Abdomen:  Soft and non tender. Bowel sounds are active in all four quadrants. Extremities:  1+ edema  Neurologic:  CN II-XII are intact. No deficits noted. Muscle strength and tone are equal throughout. Skin:                Warm and dry with no rashes. Muscles:         Hand  and leg strength are equal and strong bilaterally.      Lab Data      CBC:   Lab Results   Component Value Date    WBC 7.24 07/06/2021    HGB 13.1 (A) 07/06/2021    HCT 40.9 (A) 07/06/2021     07/06/2021     BMP:    Lab Results   Component Value Date     07/06/2021     12/28/2020     05/19/2020    K 4.7 07/06/2021    K 4.2 12/28/2020    K 3.9 05/19/2020     07/06/2021     12/28/2020     05/19/2020    CO2 31 07/06/2021    CO2 32 12/28/2020    CO2 32 05/19/2020    BUN 28 07/06/2021    BUN 29 12/28/2020    BUN 19 05/19/2020    CREATININE 1.6 07/06/2021    CREATININE 1.3 12/28/2020    CREATININE 1.3 05/19/2020    GLUCOSE 93 07/06/2021    GLUCOSE 112 12/28/2020    GLUCOSE 110 05/19/2020      Hepatic: No results found for: AST, ALT, ALB, BILITOT, ALKPHOS  BNP: No results found for: BNP  Lipids:   Lab Results   Component Value Date    CHOL 158 12/15/2017    HDL 47 12/15/2017     INR: No results found for: INR  URINE: No results found for: Chandni Wingw  Lab Results   Component Value Date    NITRU Negative 12/04/2014    COLORU Cornelia 12/04/2014    PHUR 5.0 12/04/2014    RBCUA 0 12/04/2014    YEAST 0 12/04/2014    BACTERIA 0 12/04/2014    CLARITYU Clear 12/04/2014    LEUKOCYTESUR neg 12/04/2014    BLOODU Negative 12/04/2014 GLUCOSEU neg 12/04/2014    KETUA Negative 12/04/2014      Microalbumen/Creatinine ratio:  No components found for: RUCREAT        Medications:    Current Outpatient Medications   Medication Sig Dispense Refill    bumetanide (BUMEX) 2 MG tablet Take 1 tablet by mouth once daily 90 tablet 3    atenolol (TENORMIN) 50 MG tablet Take 25 mg by mouth daily      quinapril (ACCUPRIL) 40 MG tablet Take 40 mg by mouth 1 whole in the am 1/2 in the evening      clobetasol (TEMOVATE) 0.05 % cream Apply topically 2 times daily Apply topically 2 times daily.  rosuvastatin (CRESTOR) 10 MG tablet Take 10 mg by mouth daily      tamsulosin (FLOMAX) 0.4 MG capsule Take 0.4 mg by mouth daily.  diazepam (VALIUM) 5 MG tablet Take 5 mg by mouth every 6 hours as needed.  fluocinonide (LIDEX) 0.05 % cream Apply  topically 2 times daily. Apply topically 2 times daily.  triamcinolone (KENALOG) 0.1 % cream Apply  topically daily. Apply topically 2 times daily.  alclomethasone (ACLOVATE) 0.05 % cream Apply  topically daily. Apply topically 2 times daily.  Ciclopirox 1 % SHAM Apply  topically. No current facility-administered medications for this visit. IMPRESSIONS:        Kidney disease: CKD stage G-IIIB-A1  Anemia: Anemia remains stable. No need for an erythrocyte stimulating agent (ALMA). Bone and mineral metabolism: There is no complaint of bone pain. PLAN:  1. We discussed the eGFR today. 2. We will continue all current medications without changes. 3. Avoid caffeine and spicy foods to treat prostatitis. 4. PSA level  5. We will see the patient back in 4 months.       _________________________________  Trudy Olsen.  Jessica Merida,   [facility]      CC  Rolando Solomon, APRN - CNP

## 2021-11-02 LAB
BASOPHILS ABSOLUTE: ABNORMAL
BASOPHILS RELATIVE PERCENT: ABNORMAL
BUN BLDV-MCNC: 21 MG/DL
CALCIUM SERPL-MCNC: 8.2 MG/DL
CHLORIDE BLD-SCNC: 103 MMOL/L
CO2: 33 MMOL/L
CREAT SERPL-MCNC: 1.5 MG/DL
EOSINOPHILS ABSOLUTE: ABNORMAL
EOSINOPHILS RELATIVE PERCENT: ABNORMAL
GFR CALCULATED: 48
GLUCOSE BLD-MCNC: 103 MG/DL
HCT VFR BLD CALC: 39.5 % (ref 41–53)
HEMOGLOBIN: 12.8 G/DL (ref 13.5–17.5)
LYMPHOCYTES ABSOLUTE: ABNORMAL
LYMPHOCYTES RELATIVE PERCENT: ABNORMAL
MCH RBC QN AUTO: ABNORMAL PG
MCHC RBC AUTO-ENTMCNC: ABNORMAL G/DL
MCV RBC AUTO: ABNORMAL FL
MONOCYTES ABSOLUTE: ABNORMAL
MONOCYTES RELATIVE PERCENT: ABNORMAL
NEUTROPHILS ABSOLUTE: ABNORMAL
NEUTROPHILS RELATIVE PERCENT: ABNORMAL
PLATELET # BLD: 246 K/ΜL
PMV BLD AUTO: ABNORMAL FL
POTASSIUM SERPL-SCNC: 4 MMOL/L
RBC # BLD: 4.47 10^6/ΜL
SODIUM BLD-SCNC: 142 MMOL/L
WBC # BLD: 7 10^3/ML

## 2021-11-09 ENCOUNTER — OFFICE VISIT (OUTPATIENT)
Dept: NEPHROLOGY | Age: 81
End: 2021-11-09
Payer: MEDICARE

## 2021-11-09 VITALS
BODY MASS INDEX: 41.07 KG/M2 | HEIGHT: 68 IN | WEIGHT: 271 LBS | OXYGEN SATURATION: 95 % | RESPIRATION RATE: 16 BRPM | HEART RATE: 71 BPM | DIASTOLIC BLOOD PRESSURE: 58 MMHG | SYSTOLIC BLOOD PRESSURE: 168 MMHG

## 2021-11-09 DIAGNOSIS — N18.32 STAGE 3B CHRONIC KIDNEY DISEASE (HCC): Primary | ICD-10-CM

## 2021-11-09 PROCEDURE — 1036F TOBACCO NON-USER: CPT | Performed by: INTERNAL MEDICINE

## 2021-11-09 PROCEDURE — G8484 FLU IMMUNIZE NO ADMIN: HCPCS | Performed by: INTERNAL MEDICINE

## 2021-11-09 PROCEDURE — G8427 DOCREV CUR MEDS BY ELIG CLIN: HCPCS | Performed by: INTERNAL MEDICINE

## 2021-11-09 PROCEDURE — 99214 OFFICE O/P EST MOD 30 MIN: CPT | Performed by: INTERNAL MEDICINE

## 2021-11-09 PROCEDURE — 1123F ACP DISCUSS/DSCN MKR DOCD: CPT | Performed by: INTERNAL MEDICINE

## 2021-11-09 PROCEDURE — G8417 CALC BMI ABV UP PARAM F/U: HCPCS | Performed by: INTERNAL MEDICINE

## 2021-11-09 PROCEDURE — 4040F PNEUMOC VAC/ADMIN/RCVD: CPT | Performed by: INTERNAL MEDICINE

## 2021-11-09 NOTE — PROGRESS NOTES
[facility]    [address]  [address], [address]  [phone]       Progress Note    11/9/2021 1:35 PM    Pt Name:    Rica Jordan  YOB: 1940  Primary Care Physician:  LUCY Aldrich CNP       Chief Complaint:   Chief Complaint   Patient presents with    Chronic Kidney Disease    Benign Prostatic Hypertrophy    Hypertension     wide pulse pressure-uncontrolled    Obesity     class III        History of Chief Complaint: CKD stage G-IIIB-A1 from HTN (wide pulse pressure) and bladder outlet obstruction from prostatic hypertrophy and prostatitis. Subjective:  I last saw the patient in clinic 07/13/2021. I follow the patient for Chronic Kidney disease stage G-IIIB-A1. Since our last visit the patient has not been hospitalized. The patient is sleeping well at night with 1-2 times per night nocturia. The patient has a good appetite and is remaining active. The patient denied N/V/C/D/SOB/CP. He has no trouble at bladder emptying. Family has been facing financial problems. His wife has bad dementia. He takes his blood pressure at home but does not record it. COVID-19 screening  Fever: none  Cough: none  Exposure: none  Shortness of breath: none    Albumin/creatinine ratio:   eGFR: 48 Ml/min (it was 45 Ml/Min last visit)  SCr: 1.5 Mg/Dl (It was 1.6 Mg/Dl last visit)      Last six eGFR readings:  Lab Results   Component Value Date    LABGLOM 48 11/02/2021    LABGLOM 45 07/06/2021    LABGLOM 57 12/28/2020    LABGLOM 57 05/19/2020    LABGLOM 48 11/19/2019    LABGLOM 57 11/23/2018          Objective:  VITALS:  BP (!) 168/58 (Site: Right Upper Arm, Position: Sitting, Cuff Size: Large Adult)   Pulse 71   Resp 16   Ht 5' 8\" (1.727 m)   Wt 271 lb (122.9 kg)   SpO2 95%   BMI 41.21 kg/m²   Weight:   Wt Readings from Last 3 Encounters:   11/09/21 271 lb (122.9 kg)   07/13/21 268 lb (121.6 kg)   01/08/21 284 lb (128.8 kg)     Body mass index is 41.21 kg/m². Physical examination    General:  Alert and cooperative with exam  HEENT:  Normocephalic. No lesions nor rashes. OMY. EOMI  Neck:   No JVD and no bruits. Thyroid gland is normal  Lungs:  Breathing easily. No rales nor rhonchi. No cough nor sputum production. Heart[de-identified]            RRR. No murmurs nor rubs. PMI is not enlarged nor displaced. Abdomen:  Soft and non tender. Bowel sounds are active in all four quadrants. Extremities:  no edema  Neurologic:  CN II-XII are intact. No deficits noted. Muscle strength and tone are equal throughout. Skin:                Warm and dry with no rashes. Muscles:         Hand  and leg strength are equal and strong bilaterally.      Lab Data      CBC:   Lab Results   Component Value Date    WBC 7.00 11/02/2021    HGB 12.8 (A) 11/02/2021    HCT 39.5 (A) 11/02/2021     11/02/2021     BMP:    Lab Results   Component Value Date     11/02/2021     07/06/2021     12/28/2020    K 4.0 11/02/2021    K 4.7 07/06/2021    K 4.2 12/28/2020     11/02/2021     07/06/2021     12/28/2020    CO2 33 11/02/2021    CO2 31 07/06/2021    CO2 32 12/28/2020    BUN 21 11/02/2021    BUN 28 07/06/2021    BUN 29 12/28/2020    CREATININE 1.5 11/02/2021    CREATININE 1.6 07/06/2021    CREATININE 1.3 12/28/2020    GLUCOSE 103 11/02/2021    GLUCOSE 93 07/06/2021    GLUCOSE 112 12/28/2020      Hepatic: No results found for: AST, ALT, ALB, BILITOT, ALKPHOS  BNP: No results found for: BNP  Lipids:   Lab Results   Component Value Date    CHOL 158 12/15/2017    HDL 47 12/15/2017     INR: No results found for: INR  URINE: No results found for: [place]  Lab Results   Component Value Date    NITRU Negative 12/04/2014    COLORU [place] 12/04/2014    PHUR 5.0 12/04/2014    RBCUA 0 12/04/2014    YEAST 0 12/04/2014    BACTERIA 0 12/04/2014    CLARITYU Clear 12/04/2014    LEUKOCYTESUR neg 12/04/2014    BLOODU Negative 12/04/2014    GLUCOSEU neg 12/04/2014    [address] Negative 12/04/2014      Microalbumen/Creatinine ratio:  No components found for: RUCREAT        Medications:    Current Outpatient Medications   Medication Sig Dispense Refill    bumetanide (BUMEX) 2 MG tablet Take 1 tablet by mouth once daily 90 tablet 3    atenolol (TENORMIN) 50 MG tablet Take 25 mg by mouth daily      quinapril (ACCUPRIL) 40 MG tablet Take 40 mg by mouth 1 whole in the am 1/2 in the evening      clobetasol (TEMOVATE) 0.05 % cream Apply topically 2 times daily Apply topically 2 times daily.  rosuvastatin (CRESTOR) 10 MG tablet Take 10 mg by mouth daily      tamsulosin (FLOMAX) 0.4 MG capsule Take 0.4 mg by mouth daily.  diazepam (VALIUM) 5 MG tablet Take 5 mg by mouth every 6 hours as needed.  fluocinonide (LIDEX) 0.05 % cream Apply  topically 2 times daily. Apply topically 2 times daily.  triamcinolone (KENALOG) 0.1 % cream Apply  topically daily. Apply topically 2 times daily.  alclomethasone (ACLOVATE) 0.05 % cream Apply  topically daily. Apply topically 2 times daily.  Ciclopirox 1 % SHAM Apply  topically. No current facility-administered medications for this visit. IMPRESSIONS:        Kidney disease: CKD stage G-IIIB-A1  Wide pulse pressure HTN  Anemia: Anemia remains stable. No need for an erythrocyte stimulating agent (ALMA). Bone and mineral metabolism: There is no complaint of bone pain. PLAN:  1. We discussed the eGFR today. 2. We will continue all current medications without changes. 3. He will keep blood pressure records and report to PCP if not controlled  4. Checking protein/creatinine ratio. Urinalysis. 5. We will see the patient back in 6 months.       _________________________________  Cindra EugeneRodney Schumacher DO  [facility]      CC  Wilhemmonroe Epps, APRN - CNP

## 2021-11-09 NOTE — PATIENT INSTRUCTIONS
KNOW YOUR KIDNEY NUMBERS    Your kidney speed (eGFR) was 48 ml/min this visit (normal is  ml/min)(Ml/min=milliliters of blood filtered per minute). The higher this number is, the better your kidney function is. Your serum creatinine was 1.5 (normal 0.8-1.2 mg/dl at most labs). The higher this number is, the worse your kidney function is. You are in stage G-IIIB-A1 of chronic kidney disease. Your kidney function has improved as compared to your last visit. Your last eGFR was  45 Ml/Min. Stages of kidney disease  EGFR (estimated glomerular filtration rate)  G-I > 90 ml/min Kidney damage with normal kidney function (blood or protein in the urine)  G-II 60-89 ml/min Normal kidney function with mild damage with or without blood or protein in the urine  G-IIIA 45-59 ml/min Mild to moderate loss of kidney function. G-IIIB 30-44 ml/min Moderate to severe loss of kidney function  G-IV 15-29 ml/min Severe loss of kidney function  G-V < 15 mlmin     May need dialysis or kidney transplant    ACR (urine albumin/creatinine ratio) (Mg/Gm)  A-1      ACR<30 Normal to mildly increased protein in the urine. A-2 ACR  Moderate increase in urine protein loss. A-3 ACR >300 Severe increase in urine protein loss    Our goal is to keep your eGFR going as fast as possible ( ml/min is normal). If your eGFR declines to 15-24 ml/min and stays there without recovery,  we will begin to educate you about dialysis or kidney transplant. We also want to keep the protein in your urine as low as possible. The leading cause of kidney disease in the world is diabetes mellitus. Keep your sugar  as much as possible. The second leading cause is hypertension. Keep Your blood pressure 120-140/70-80 as much as possible. If you need refills, call the office or your drug store. You may call the office any time with any questions or concerns. We use Epic software to manage your private patient data securely.  Any health care provider or hospital in the world that uses Epic software can see your data if they have the appropriate credentials. DUE TO THE CORONAVIRUS CONCERN, PLEASE LIMIT YOUR TIME IN PUBLIC. 8 Savi Songidi YOUR HANDS COMPLETELY AND FREQUENTLY. Lulú Vance

## 2022-05-05 LAB
BASOPHILS ABSOLUTE: ABNORMAL
BASOPHILS RELATIVE PERCENT: ABNORMAL
BILIRUBIN, URINE: NEGATIVE
BLOOD, URINE: NEGATIVE
BUN BLDV-MCNC: 32 MG/DL
CALCIUM SERPL-MCNC: 8.4 MG/DL
CHLORIDE BLD-SCNC: 102 MMOL/L
CLARITY: CLEAR
CO2: 32 MMOL/L
COLOR: YELLOW
CREAT SERPL-MCNC: 1.4 MG/DL
EOSINOPHILS ABSOLUTE: ABNORMAL
EOSINOPHILS RELATIVE PERCENT: ABNORMAL
GFR CALCULATED: 52
GLUCOSE BLD-MCNC: 95 MG/DL
GLUCOSE URINE: NEGATIVE
HCT VFR BLD CALC: 40.1 % (ref 41–53)
HEMOGLOBIN: 12.9 G/DL (ref 13.5–17.5)
KETONES, URINE: NEGATIVE
LEUKOCYTE ESTERASE, URINE: NEGATIVE
LYMPHOCYTES ABSOLUTE: ABNORMAL
LYMPHOCYTES RELATIVE PERCENT: ABNORMAL
MCH RBC QN AUTO: ABNORMAL PG
MCHC RBC AUTO-ENTMCNC: ABNORMAL G/DL
MCV RBC AUTO: ABNORMAL FL
MONOCYTES ABSOLUTE: ABNORMAL
MONOCYTES RELATIVE PERCENT: ABNORMAL
NEUTROPHILS ABSOLUTE: ABNORMAL
NEUTROPHILS RELATIVE PERCENT: ABNORMAL
NITRITE, URINE: NEGATIVE
PH UA: 5.5 (ref 4.5–8)
PLATELET # BLD: 242 K/ΜL
PMV BLD AUTO: ABNORMAL FL
POTASSIUM SERPL-SCNC: 4 MMOL/L
PROTEIN UA: NEGATIVE
RBC # BLD: 4.65 10^6/ΜL
SODIUM BLD-SCNC: 141 MMOL/L
SPECIFIC GRAVITY, URINE: <=1.005
UROBILINOGEN, URINE: NORMAL
WBC # BLD: 7.82 10^3/ML

## 2022-05-10 ENCOUNTER — OFFICE VISIT (OUTPATIENT)
Dept: NEPHROLOGY | Age: 82
End: 2022-05-10
Payer: MEDICARE

## 2022-05-10 VITALS
SYSTOLIC BLOOD PRESSURE: 146 MMHG | BODY MASS INDEX: 44.71 KG/M2 | OXYGEN SATURATION: 98 % | HEART RATE: 76 BPM | DIASTOLIC BLOOD PRESSURE: 74 MMHG | HEIGHT: 68 IN | WEIGHT: 295 LBS

## 2022-05-10 DIAGNOSIS — N18.32 STAGE 3B CHRONIC KIDNEY DISEASE (HCC): Primary | ICD-10-CM

## 2022-05-10 PROCEDURE — 4040F PNEUMOC VAC/ADMIN/RCVD: CPT | Performed by: INTERNAL MEDICINE

## 2022-05-10 PROCEDURE — 99213 OFFICE O/P EST LOW 20 MIN: CPT | Performed by: INTERNAL MEDICINE

## 2022-05-10 PROCEDURE — 1036F TOBACCO NON-USER: CPT | Performed by: INTERNAL MEDICINE

## 2022-05-10 PROCEDURE — G8427 DOCREV CUR MEDS BY ELIG CLIN: HCPCS | Performed by: INTERNAL MEDICINE

## 2022-05-10 PROCEDURE — 1123F ACP DISCUSS/DSCN MKR DOCD: CPT | Performed by: INTERNAL MEDICINE

## 2022-05-10 PROCEDURE — G8417 CALC BMI ABV UP PARAM F/U: HCPCS | Performed by: INTERNAL MEDICINE

## 2022-05-10 RX ORDER — BUMETANIDE 2 MG/1
2 TABLET ORAL DAILY
Qty: 90 TABLET | Refills: 3 | Status: SHIPPED | OUTPATIENT
Start: 2022-05-10 | End: 2022-08-08

## 2022-05-10 NOTE — PROGRESS NOTES
[address]  [address] [address]  Dept: [phone]  Loc: [phone]  Progress Note  5/10/2022 2:20 PM      Pt Name:    Rhea Contreras  YOB: 1940  Primary Care Physician:  LUCY Buchanan - CNP     Chief Complaint:   Chief Complaint   Patient presents with    Follow-up     Christian CKD III        History of Present Illness: This is a follow-up visit for CKD III. He is a former patient of Dr. Carolee Atkins, this is the first time I am seeing him. comorbidities include HTN, HLD, BPH. Had recent prostatitis and was treated with antibiotics. flomax increased to BID. He stopped taking atenolol when he ran out of it, he thinks his urination is better being off of it. Was Rx'd by Dr. Helene Hummel. BP borderline. He does not check it at home. Weight is up almost 25 pounds, he says this is dietary related. Has chronic edema, on bumex 2 mg daily. This has not worsened. His wife passed away a few months ago so has a lot of stress. Pertinent items are noted in HPI. Past History:  Past Medical History:   Diagnosis Date    CKD (chronic kidney disease) stage 3, GFR 30-59 ml/min ([facility])     Esophageal reflux     HTN (hypertension)     Hyperlipemia     RLS (restless legs syndrome)     Sleep apnea      Past Surgical History:   Procedure Laterality Date    CHOLECYSTECTOMY  Feb 2015    JOINT REPLACEMENT Left Dec 2014    NASAL SINUS SURGERY  Oct 2014        VITALS:  BP (!) 146/74 (Site: Left Upper Arm, Position: Sitting, Cuff Size: Large Adult)   Pulse 76   Ht 5' 8\" (1.727 m)   Wt 295 lb (133.8 kg)   SpO2 98%   BMI 44.85 kg/m²   Wt Readings from Last 3 Encounters:   05/10/22 295 lb (133.8 kg)   11/09/21 271 lb (122.9 kg)   07/13/21 268 lb (121.6 kg)     Body mass index is 44.85 kg/m².      General Appearance: alert and cooperative with exam, appears comfortable, no distress, hard of hearing  HEENT: EOMI, moist oral mucus membranes  Neck: No jugular venous distention  Lungs: Air entry B/L, no crackles or rales, no use of accessory muscles  Heart: S1, S2 heard, no rub  GI: soft, non-tender, no guarding,  Extremities: chronic LE edema 1+  Skin: warm, dry  Neurologic: no tremor, no asterixis     Medications:  Current Outpatient Medications   Medication Sig Dispense Refill    bumetanide (BUMEX) 2 MG tablet Take 1 tablet by mouth daily 90 tablet 3    quinapril (ACCUPRIL) 40 MG tablet Take 40 mg by mouth in the morning and at bedtime 1 whole in the am 1/2 in the evening       clobetasol (TEMOVATE) 0.05 % cream Apply topically 2 times daily Apply topically 2 times daily.  rosuvastatin (CRESTOR) 10 MG tablet Take 10 mg by mouth daily      tamsulosin (FLOMAX) 0.4 MG capsule Take 0.4 mg by mouth in the morning and at bedtime       diazepam (VALIUM) 5 MG tablet Take 5 mg by mouth every 6 hours as needed.  fluocinonide (LIDEX) 0.05 % cream Apply  topically 2 times daily. Apply topically 2 times daily.  triamcinolone (KENALOG) 0.1 % cream Apply  topically daily. Apply topically 2 times daily.  alclomethasone (ACLOVATE) 0.05 % cream Apply  topically daily. Apply topically 2 times daily.  Ciclopirox 1 % SHAM Apply  topically.  atenolol (TENORMIN) 50 MG tablet Take 25 mg by mouth daily (Patient not taking: Reported on 5/10/2022)       No current facility-administered medications for this visit.         Laboratory & Diagnostics:  CBC:   Lab Results   Component Value Date    WBC 7.82 05/05/2022    HGB 12.9 (A) 05/05/2022    HCT 40.1 (A) 05/05/2022     05/05/2022     BMP:    Lab Results   Component Value Date     05/05/2022     11/02/2021     07/06/2021    K 4.0 05/05/2022    K 4.0 11/02/2021    K 4.7 07/06/2021     05/05/2022     11/02/2021     07/06/2021    CO2 32 05/05/2022    CO2 33 11/02/2021    CO2 31 07/06/2021    BUN 32 05/05/2022 BUN 21 11/02/2021    BUN 28 07/06/2021    CREATININE 1.4 05/05/2022    CREATININE 1.5 11/02/2021    CREATININE 1.6 07/06/2021    GLUCOSE 95 05/05/2022    GLUCOSE 103 11/02/2021    GLUCOSE 93 07/06/2021      Hepatic: No results found for: AST, ALT, ALB, BILITOT, ALKPHOS  BNP: No results found for: BNP  Lipids:   Lab Results   Component Value Date    CHOL 158 12/15/2017    HDL 47 12/15/2017     INR: No results found for: INR  URINE: No results found for: Meryle Spaniel  Lab Results   Component Value Date    NITRU Negative 05/05/2022    COLORU Yellow 05/05/2022    PHUR 5.5 05/05/2022    RBCUA 0 12/04/2014    YEAST 0 12/04/2014    BACTERIA 0 12/04/2014    CLARITYU Clear 05/05/2022    LEUKOCYTESUR Negative 05/05/2022    UROBILINOGEN 0.2 mg/dL 05/05/2022    BILIRUBINUR Negative 05/05/2022    BLOODU Negative 05/05/2022    GLUCOSEU negative 05/05/2022    KETUA Negative 05/05/2022      Microalbumen/Creatinine ratio:  No components found for: RUCREAT        Impression/Plan:   1. CKD IIIIa likely hypertensive nephrosclerosis: stable . No proteinuria. Goals of care include slowing rate of progression by controlling blood pressure and by avoiding nephrotoxins such as NSAIDs and IV contrast.    2. HTN: needs to monitor at home, Bps are borderline high. Pt stopped taking atenolol on his own  3. BPH, symptoms better  4. Obesity  5. Hx kidney stones    Bloodwork and medications were reviewed and plan of care discussed with the patient. Return to clinic in 6 months  or sooner if the need arises.       William Notice, DO  [facility]

## 2022-10-31 ENCOUNTER — TELEPHONE (OUTPATIENT)
Dept: NEPHROLOGY | Age: 82
End: 2022-10-31

## 2022-10-31 DIAGNOSIS — N18.32 STAGE 3B CHRONIC KIDNEY DISEASE (HCC): Primary | ICD-10-CM

## 2022-10-31 LAB
BASOPHILS ABSOLUTE: ABNORMAL
BASOPHILS RELATIVE PERCENT: ABNORMAL
BUN BLDV-MCNC: 47 MG/DL
CALCIUM SERPL-MCNC: 8.8 MG/DL
CHLORIDE BLD-SCNC: 102 MMOL/L
CO2: 27 MMOL/L
CREAT SERPL-MCNC: 1.9 MG/DL
EOSINOPHILS ABSOLUTE: ABNORMAL
EOSINOPHILS RELATIVE PERCENT: ABNORMAL
GFR CALCULATED: 36
GLUCOSE BLD-MCNC: 103 MG/DL
HCT VFR BLD CALC: 38 % (ref 41–53)
HEMOGLOBIN: 12.3 G/DL (ref 13.5–17.5)
LYMPHOCYTES ABSOLUTE: ABNORMAL
LYMPHOCYTES RELATIVE PERCENT: ABNORMAL
MCH RBC QN AUTO: ABNORMAL PG
MCHC RBC AUTO-ENTMCNC: ABNORMAL G/DL
MCV RBC AUTO: ABNORMAL FL
MONOCYTES ABSOLUTE: ABNORMAL
MONOCYTES RELATIVE PERCENT: ABNORMAL
NEUTROPHILS ABSOLUTE: ABNORMAL
NEUTROPHILS RELATIVE PERCENT: ABNORMAL
PHOSPHORUS: 3.3 MG/DL
PLATELET # BLD: 255 K/ΜL
PMV BLD AUTO: ABNORMAL FL
POTASSIUM SERPL-SCNC: 4.9 MMOL/L
PTH INTACT: 275.3
RBC # BLD: 4.31 10^6/ΜL
SODIUM BLD-SCNC: 137 MMOL/L
VITAMIN D 25-HYDROXY: 21.8
VITAMIN D2, 25 HYDROXY: NORMAL
VITAMIN D3,25 HYDROXY: NORMAL
WBC # BLD: 6.81 10^3/ML

## 2022-10-31 NOTE — TELEPHONE ENCOUNTER
----- Message from Cherrie Lamb DO sent at 10/31/2022  4:03 PM EDT -----  Kidney function is a little worse. Hold bumex 2 days.    Repeat a bmp next week prior to appt

## 2022-11-08 ENCOUNTER — OFFICE VISIT (OUTPATIENT)
Dept: NEPHROLOGY | Age: 82
End: 2022-11-08
Payer: MEDICARE

## 2022-11-08 VITALS
HEART RATE: 74 BPM | DIASTOLIC BLOOD PRESSURE: 74 MMHG | WEIGHT: 297 LBS | BODY MASS INDEX: 45.16 KG/M2 | OXYGEN SATURATION: 98 % | SYSTOLIC BLOOD PRESSURE: 124 MMHG

## 2022-11-08 DIAGNOSIS — N28.1 RENAL CYST: Primary | ICD-10-CM

## 2022-11-08 DIAGNOSIS — N18.32 STAGE 3B CHRONIC KIDNEY DISEASE (HCC): ICD-10-CM

## 2022-11-08 PROCEDURE — G8427 DOCREV CUR MEDS BY ELIG CLIN: HCPCS | Performed by: INTERNAL MEDICINE

## 2022-11-08 PROCEDURE — 3078F DIAST BP <80 MM HG: CPT | Performed by: INTERNAL MEDICINE

## 2022-11-08 PROCEDURE — G8484 FLU IMMUNIZE NO ADMIN: HCPCS | Performed by: INTERNAL MEDICINE

## 2022-11-08 PROCEDURE — G8417 CALC BMI ABV UP PARAM F/U: HCPCS | Performed by: INTERNAL MEDICINE

## 2022-11-08 PROCEDURE — 1036F TOBACCO NON-USER: CPT | Performed by: INTERNAL MEDICINE

## 2022-11-08 PROCEDURE — 1123F ACP DISCUSS/DSCN MKR DOCD: CPT | Performed by: INTERNAL MEDICINE

## 2022-11-08 PROCEDURE — 99214 OFFICE O/P EST MOD 30 MIN: CPT | Performed by: INTERNAL MEDICINE

## 2022-11-08 PROCEDURE — 3074F SYST BP LT 130 MM HG: CPT | Performed by: INTERNAL MEDICINE

## 2022-11-08 RX ORDER — CALCITRIOL 0.25 UG/1
0.25 CAPSULE, LIQUID FILLED ORAL
Qty: 36 CAPSULE | Refills: 1 | Status: SHIPPED | OUTPATIENT
Start: 2022-11-09 | End: 2023-02-07

## 2022-11-08 RX ORDER — BUMETANIDE 1 MG/1
1 TABLET ORAL DAILY
Qty: 90 TABLET | Refills: 3 | Status: SHIPPED | OUTPATIENT
Start: 2022-11-08 | End: 2023-02-06

## 2022-11-08 RX ORDER — HYDRALAZINE HYDROCHLORIDE 25 MG/1
TABLET, FILM COATED ORAL
COMMUNITY
Start: 2022-10-26

## 2022-11-08 NOTE — PROGRESS NOTES
[address] [place] [address] [address] [address]  Dept: [phone]  Loc: [phone]  Progress Note  11/8/2022 2:04 PM      Pt Name:    Marie Greco  YOB: 1940  Primary Care Physician:  LUCY Dickson - CNP     Chief Complaint:   Chief Complaint   Patient presents with    Follow-up     CKD III        History of Present Illness: This is a follow-up visit for CKD III. He is a former patient of Dr. Waylon Meng. Comorbidities include HTN, HLD, BPH. Overall doing ok. Sounds like PCP doubled up his ACE-I and added hydralazine. BP looks good today. Creatinine up from baseline. On bumex 2 mg daily. Pertinent items are noted in HPI. Past History:  Past Medical History:   Diagnosis Date    CKD (chronic kidney disease) stage 3, GFR 30-59 ml/min ([facility])     Esophageal reflux     HTN (hypertension)     Hyperlipemia     RLS (restless legs syndrome)     Sleep apnea      Past Surgical History:   Procedure Laterality Date    CHOLECYSTECTOMY  Feb 2015    JOINT REPLACEMENT Left Dec 2014    NASAL SINUS SURGERY  Oct 2014        VITALS:  /74 (Site: Right Upper Arm, Position: Sitting, Cuff Size: Large Adult)   Pulse 74   Wt 297 lb (134.7 kg)   SpO2 98%   BMI 45.16 kg/m²   Wt Readings from Last 3 Encounters:   11/08/22 297 lb (134.7 kg)   05/10/22 295 lb (133.8 kg)   11/09/21 271 lb (122.9 kg)     Body mass index is 45.16 kg/m².      General Appearance: alert and cooperative with exam, appears comfortable, no distress, hard of hearing  HEENT: EOMI, moist oral mucus membranes  Neck: No jugular venous distention  Lungs: Air entry B/L, no crackles or rales, no use of accessory muscles  Heart: S1, S2 heard, no rub  GI: soft, non-tender, no guarding,  Extremities: chronic LE edema 1+  Skin: warm, dry  Neurologic: no tremor, no asterixis     Medications:  Current Outpatient Medications   Medication Sig Dispense Refill    hydrALAZINE (APRESOLINE) 25 MG tablet TAKE 1 TABLET BY MOUTH TWICE DAILY WITH FOOD      [START ON 11/9/2022] calcitRIOL (ROCALTROL) 0.25 MCG capsule Take 1 capsule by mouth three times a week 36 capsule 1    bumetanide (BUMEX) 1 MG tablet Take 1 tablet by mouth daily 90 tablet 3    quinapril (ACCUPRIL) 40 MG tablet Take 40 mg by mouth in the morning and at bedtime      clobetasol (TEMOVATE) 0.05 % cream Apply topically 2 times daily Apply topically 2 times daily. rosuvastatin (CRESTOR) 10 MG tablet Take 10 mg by mouth daily      tamsulosin (FLOMAX) 0.4 MG capsule Take 0.4 mg by mouth in the morning and at bedtime       diazepam (VALIUM) 5 MG tablet Take 5 mg by mouth every 6 hours as needed. fluocinonide (LIDEX) 0.05 % cream Apply  topically 2 times daily. Apply topically 2 times daily. triamcinolone (KENALOG) 0.1 % cream Apply  topically daily. Apply topically 2 times daily. alclomethasone (ACLOVATE) 0.05 % cream Apply  topically daily. Apply topically 2 times daily. Ciclopirox 1 % SHAM Apply  topically. No current facility-administered medications for this visit.         Laboratory & Diagnostics:  CBC:   Lab Results   Component Value Date    WBC 6.81 10/31/2022    HGB 12.3 (A) 10/31/2022    HCT 38.0 (A) 10/31/2022     10/31/2022     BMP:    Lab Results   Component Value Date     10/31/2022     05/05/2022     11/02/2021    K 4.9 10/31/2022    K 4.0 05/05/2022    K 4.0 11/02/2021     10/31/2022     05/05/2022     11/02/2021    CO2 27 10/31/2022    CO2 32 05/05/2022    CO2 33 11/02/2021    BUN 47 10/31/2022    BUN 32 05/05/2022    BUN 21 11/02/2021    CREATININE 1.9 10/31/2022    CREATININE 1.4 05/05/2022    CREATININE 1.5 11/02/2021    GLUCOSE 103 10/31/2022    GLUCOSE 95 05/05/2022    GLUCOSE 103 11/02/2021      Hepatic: No results found for: AST, ALT, ALB, BILITOT, ALKPHOS  BNP: No results found for: BNP  Lipids:   Lab Results Component Value Date    CHOL 158 12/15/2017    HDL 47 12/15/2017     INR: No results found for: INR  URINE: No results found for: Shauna Nunez  Lab Results   Component Value Date/Time    NITRU Negative 05/05/2022 12:00 AM    COLORU Yellow 05/05/2022 12:00 AM    PHUR 5.5 05/05/2022 12:00 AM    RBCUA 0 12/04/2014 12:00 AM    YEAST 0 12/04/2014 12:00 AM    BACTERIA 0 12/04/2014 12:00 AM    CLARITYU Clear 05/05/2022 12:00 AM    LEUKOCYTESUR Negative 05/05/2022 12:00 AM    UROBILINOGEN 0.2 mg/dL 05/05/2022 12:00 AM    BILIRUBINUR Negative 05/05/2022 12:00 AM    BLOODU Negative 05/05/2022 12:00 AM    GLUCOSEU negative 05/05/2022 12:00 AM    KETUA Negative 05/05/2022 12:00 AM      Microalbumen/Creatinine ratio:  No components found for: RUCREAT        Impression/Plan:   1. CKD IIII likely hypertensive nephrosclerosis: creatinine up from baseline. Hold bumex x 2 days then reduce to 1 mg daily. Bmp in 2 weeks Goals of care include slowing rate of progression by controlling blood pressure and by avoiding nephrotoxins such as NSAIDs and IV contrast.    2. HTN: controlled  3. BPH, symptoms better  4. Obesity  5. Hx kidney stones  6. Left hypodense renal lesion ( seen on CT in January), likely a cyst. Will get ultrasound  7. Secondary hyperparathyroidism: start OTC Vitamin D3 2000 units daily and start calcitirol 0.25 mcg 3xweekly    Bloodwork and medications were reviewed and plan of care discussed with the patient. Return to clinic in 6 months  or sooner if the need arises.       Alicja Cruz,   [facility]

## 2022-11-08 NOTE — PATIENT INSTRUCTIONS
Hold bumex x 2 days then decrease bumex to 1 mg daily starting on Friday  Start Calcitriol 0.25 mcg three times weekly for your parathyroid gland. Start over the counter Vitamin D3 1000 units daily. Repeat labs in 2 weeks.    Will get an ultrasound to look at spot on your left kidney

## 2022-11-16 DIAGNOSIS — N28.1 RENAL CYST: ICD-10-CM

## 2022-11-17 ENCOUNTER — TELEPHONE (OUTPATIENT)
Dept: NEPHROLOGY | Age: 82
End: 2022-11-17

## 2022-11-28 LAB
BUN BLDV-MCNC: 37 MG/DL
CALCIUM SERPL-MCNC: 9.1 MG/DL
CHLORIDE BLD-SCNC: 105 MMOL/L
CO2: 27 MMOL/L
CREAT SERPL-MCNC: 1.6 MG/DL
GFR CALCULATED: 44
GLUCOSE BLD-MCNC: 93 MG/DL
POTASSIUM SERPL-SCNC: 4.8 MMOL/L
SODIUM BLD-SCNC: 138 MMOL/L

## 2023-04-05 RX ORDER — CALCITRIOL 0.25 UG/1
0.25 CAPSULE, LIQUID FILLED ORAL
Qty: 36 CAPSULE | Refills: 0 | Status: SHIPPED | OUTPATIENT
Start: 2023-04-05 | End: 2023-07-04

## 2023-05-01 LAB
BUN BLDV-MCNC: 24 MG/DL
CALCIUM SERPL-MCNC: 8.5 MG/DL
CHLORIDE BLD-SCNC: 105 MMOL/L
CO2: 28 MMOL/L
CREAT SERPL-MCNC: 1.7 MG/DL
EGFR: 41
GLUCOSE BLD-MCNC: 98 MG/DL
PHOSPHORUS: 2.8 MG/DL
POTASSIUM SERPL-SCNC: 4.8 MMOL/L
PTH INTACT: 231.8
SODIUM BLD-SCNC: 138 MMOL/L

## 2023-07-06 ENCOUNTER — OFFICE VISIT (OUTPATIENT)
Dept: NEPHROLOGY | Age: 83
End: 2023-07-06
Payer: MEDICARE

## 2023-07-06 VITALS
HEART RATE: 62 BPM | SYSTOLIC BLOOD PRESSURE: 155 MMHG | OXYGEN SATURATION: 98 % | DIASTOLIC BLOOD PRESSURE: 72 MMHG | WEIGHT: 288 LBS | BODY MASS INDEX: 43.79 KG/M2

## 2023-07-06 DIAGNOSIS — N18.32 STAGE 3B CHRONIC KIDNEY DISEASE (HCC): Primary | ICD-10-CM

## 2023-07-06 PROCEDURE — 99214 OFFICE O/P EST MOD 30 MIN: CPT | Performed by: INTERNAL MEDICINE

## 2023-07-06 PROCEDURE — 3077F SYST BP >= 140 MM HG: CPT | Performed by: INTERNAL MEDICINE

## 2023-07-06 PROCEDURE — 1123F ACP DISCUSS/DSCN MKR DOCD: CPT | Performed by: INTERNAL MEDICINE

## 2023-07-06 PROCEDURE — G8417 CALC BMI ABV UP PARAM F/U: HCPCS | Performed by: INTERNAL MEDICINE

## 2023-07-06 PROCEDURE — G8427 DOCREV CUR MEDS BY ELIG CLIN: HCPCS | Performed by: INTERNAL MEDICINE

## 2023-07-06 PROCEDURE — 3078F DIAST BP <80 MM HG: CPT | Performed by: INTERNAL MEDICINE

## 2023-07-06 PROCEDURE — 1036F TOBACCO NON-USER: CPT | Performed by: INTERNAL MEDICINE

## 2023-07-06 RX ORDER — CHOLECALCIFEROL (VITAMIN D3) 1250 MCG
CAPSULE ORAL
COMMUNITY

## 2023-07-06 RX ORDER — BUMETANIDE 1 MG/1
1 TABLET ORAL DAILY
Qty: 90 TABLET | Refills: 3 | Status: SHIPPED | OUTPATIENT
Start: 2023-07-06 | End: 2023-10-04

## 2023-07-06 RX ORDER — LABETALOL 100 MG/1
100 TABLET, FILM COATED ORAL 2 TIMES DAILY
COMMUNITY
Start: 2023-06-16

## 2023-07-06 RX ORDER — LISINOPRIL 40 MG/1
40 TABLET ORAL DAILY
COMMUNITY

## 2023-07-06 RX ORDER — CALCITRIOL 0.25 UG/1
0.25 CAPSULE, LIQUID FILLED ORAL DAILY
Qty: 90 CAPSULE | Refills: 3 | Status: SHIPPED | OUTPATIENT
Start: 2023-07-06 | End: 2023-10-04

## 2023-12-26 LAB
BUN BLDV-MCNC: 22 MG/DL
CALCIUM SERPL-MCNC: 8.8 MG/DL
CHLORIDE BLD-SCNC: 103 MMOL/L
CO2: 24 MMOL/L
CREAT SERPL-MCNC: 1.5 MG/DL
CREATININE, URINE: 7.3
EGFR: 48
GLUCOSE BLD-MCNC: 154 MG/DL
MICROALBUMIN/CREAT 24H UR: 142.9 MG/G{CREAT}
MICROALBUMIN/CREAT UR-RTO: 195
POTASSIUM SERPL-SCNC: 4.3 MMOL/L
PTH INTACT: 113.4
SODIUM BLD-SCNC: 139 MMOL/L

## 2024-01-04 ENCOUNTER — OFFICE VISIT (OUTPATIENT)
Dept: NEPHROLOGY | Age: 84
End: 2024-01-04
Payer: MEDICARE

## 2024-01-04 VITALS
BODY MASS INDEX: 42.42 KG/M2 | DIASTOLIC BLOOD PRESSURE: 64 MMHG | SYSTOLIC BLOOD PRESSURE: 134 MMHG | OXYGEN SATURATION: 97 % | HEART RATE: 56 BPM | WEIGHT: 279 LBS

## 2024-01-04 DIAGNOSIS — N18.31 STAGE 3A CHRONIC KIDNEY DISEASE (HCC): Primary | ICD-10-CM

## 2024-01-04 PROCEDURE — 1123F ACP DISCUSS/DSCN MKR DOCD: CPT | Performed by: INTERNAL MEDICINE

## 2024-01-04 PROCEDURE — G8417 CALC BMI ABV UP PARAM F/U: HCPCS | Performed by: INTERNAL MEDICINE

## 2024-01-04 PROCEDURE — 3078F DIAST BP <80 MM HG: CPT | Performed by: INTERNAL MEDICINE

## 2024-01-04 PROCEDURE — G8484 FLU IMMUNIZE NO ADMIN: HCPCS | Performed by: INTERNAL MEDICINE

## 2024-01-04 PROCEDURE — 4004F PT TOBACCO SCREEN RCVD TLK: CPT | Performed by: INTERNAL MEDICINE

## 2024-01-04 PROCEDURE — 99213 OFFICE O/P EST LOW 20 MIN: CPT | Performed by: INTERNAL MEDICINE

## 2024-01-04 PROCEDURE — G8427 DOCREV CUR MEDS BY ELIG CLIN: HCPCS | Performed by: INTERNAL MEDICINE

## 2024-01-04 PROCEDURE — 3075F SYST BP GE 130 - 139MM HG: CPT | Performed by: INTERNAL MEDICINE

## 2024-01-04 NOTE — PROGRESS NOTES
[facility] [place] [facility]  [facility]  [address]  [address]  [facility] [address]  Dept: [phone]  Loc: [phone]  Progress Note  1/4/2024 11:43 AM      Pt Name:    Dave Ren  YOB: 1940  Primary Care Physician:  Silvia Montez, APRN - CNP     Chief Complaint:   Chief Complaint   Patient presents with    Follow-up     6 mo fu CKD III        History of Present Illness:   This is a follow-up visit for CKD III. He is a former patient of Dr. Orta.  Comorbidities include HTN, HLD, BPH.    Had episode of gout about a month ago, has never had it before. Watching his diet closer now. Treated with steroid.   Sugar was high on bloodwork, not diabetic.   Bp looks ok.   Was in ED Few weeks ago for possible stroke like symptoms although pt denied. He reports work up was unremarkable.     Pertinent items are noted in HPI.         Past History:  Past Medical History:   Diagnosis Date    CKD (chronic kidney disease) stage 3, GFR 30-59 ml/min ([facility])     Esophageal reflux     HTN (hypertension)     Hyperlipemia     RLS (restless legs syndrome)     Sleep apnea      Past Surgical History:   Procedure Laterality Date    CHOLECYSTECTOMY  Feb 2015    JOINT REPLACEMENT Left Dec 2014    NASAL SINUS SURGERY  Oct 2014        VITALS:  /64 (Site: Right Upper Arm, Position: Sitting, Cuff Size: Large Adult)   Pulse 56   Wt 126.6 kg (279 lb)   SpO2 97%   BMI 42.42 kg/m²   Wt Readings from Last 3 Encounters:   01/04/24 126.6 kg (279 lb)   07/06/23 130.6 kg (288 lb)   11/08/22 134.7 kg (297 lb)     Body mass index is 42.42 kg/m².     General Appearance: alert and cooperative with exam, appears comfortable, no distress, hard of hearing  HEENT: EOMI, moist oral mucus membranes  Neck: No jugular venous distention  Lungs: Air entry B/L, no crackles or rales, no use of accessory muscles  Heart: S1, S2 heard, no rub  GI: soft, non-tender, no guarding,  Extremities:

## 2024-07-01 RX ORDER — CALCITRIOL 0.25 UG/1
0.25 CAPSULE, LIQUID FILLED ORAL DAILY
Qty: 90 CAPSULE | Refills: 4 | Status: SHIPPED | OUTPATIENT
Start: 2024-07-01

## 2024-07-11 RX ORDER — BUMETANIDE 1 MG/1
1 TABLET ORAL DAILY
Qty: 90 TABLET | Refills: 0 | Status: SHIPPED | OUTPATIENT
Start: 2024-07-11

## 2024-10-08 RX ORDER — BUMETANIDE 1 MG/1
1 TABLET ORAL DAILY
Qty: 90 TABLET | Refills: 3 | Status: SHIPPED | OUTPATIENT
Start: 2024-10-08

## 2024-12-19 LAB
BASOPHILS ABSOLUTE: ABNORMAL
BASOPHILS RELATIVE PERCENT: ABNORMAL
BUN BLDV-MCNC: 28 MG/DL
CALCIUM SERPL-MCNC: 9.5 MG/DL
CHLORIDE BLD-SCNC: 103 MMOL/L
CO2: 28 MMOL/L
CREAT SERPL-MCNC: 1.7 MG/DL
CREATININE URINE: 40.3 MG/DL
EGFR: 41
EOSINOPHILS ABSOLUTE: ABNORMAL
EOSINOPHILS RELATIVE PERCENT: ABNORMAL
GLUCOSE BLD-MCNC: 110 MG/DL
HCT VFR BLD CALC: 37.7 % (ref 41–53)
HEMOGLOBIN: 12.2 G/DL (ref 13.5–17.5)
LYMPHOCYTES ABSOLUTE: ABNORMAL
LYMPHOCYTES RELATIVE PERCENT: ABNORMAL
MCH RBC QN AUTO: ABNORMAL PG
MCHC RBC AUTO-ENTMCNC: ABNORMAL G/DL
MCV RBC AUTO: ABNORMAL FL
MICROALBUMIN/CREAT 24H UR: 6 MG/DL
MICROALBUMIN/CREAT UR-RTO: 14.9 MG/G
MONOCYTES ABSOLUTE: ABNORMAL
MONOCYTES RELATIVE PERCENT: ABNORMAL
NEUTROPHILS ABSOLUTE: ABNORMAL
NEUTROPHILS RELATIVE PERCENT: ABNORMAL
PHOSPHORUS: 4.5 MG/DL
PLATELET # BLD: 235 K/ΜL
PMV BLD AUTO: ABNORMAL FL
POTASSIUM SERPL-SCNC: 4.6 MMOL/L
PTH INTACT: 60.1
RBC # BLD: 4.35 10^6/ΜL
SODIUM BLD-SCNC: 140 MMOL/L
URIC ACID: 9.4
VITAMIN D 25-HYDROXY: 49.1
VITAMIN D2, 25 HYDROXY: NORMAL
VITAMIN D3,25 HYDROXY: NORMAL
WBC # BLD: 7.12 10^3/ML

## 2025-01-02 ENCOUNTER — OFFICE VISIT (OUTPATIENT)
Dept: NEPHROLOGY | Age: 85
End: 2025-01-02

## 2025-01-02 VITALS
WEIGHT: 293.3 LBS | HEIGHT: 68 IN | BODY MASS INDEX: 44.45 KG/M2 | DIASTOLIC BLOOD PRESSURE: 65 MMHG | OXYGEN SATURATION: 96 % | HEART RATE: 69 BPM | SYSTOLIC BLOOD PRESSURE: 118 MMHG

## 2025-01-02 DIAGNOSIS — N18.32 STAGE 3B CHRONIC KIDNEY DISEASE (HCC): Primary | ICD-10-CM

## 2025-01-02 NOTE — PROGRESS NOTES
Dave states he notices a very strong odor upon urination. He doesn't report any issues with discomfort or problems urinating. He does report bilateral leg swelling that hasn't changed much since last year. He does wear compression socks.   
  Medication Sig Dispense Refill    bumetanide (BUMEX) 1 MG tablet Take 1 tablet by mouth once daily 90 tablet 3    calcitRIOL (ROCALTROL) 0.25 MCG capsule Take 1 capsule by mouth once daily 90 capsule 4    labetalol (NORMODYNE) 100 MG tablet Take 1 tablet by mouth 2 times daily      Cholecalciferol (VITAMIN D3) 1.25 MG (09027 UT) CAPS Take by mouth      lisinopril (PRINIVIL;ZESTRIL) 40 MG tablet Take 1 tablet by mouth daily      clobetasol (TEMOVATE) 0.05 % cream Apply topically 2 times daily Apply topically 2 times daily.      rosuvastatin (CRESTOR) 10 MG tablet Take 1 tablet by mouth daily      tamsulosin (FLOMAX) 0.4 MG capsule Take 1 capsule by mouth in the morning and at bedtime      diazepam (VALIUM) 5 MG tablet Take 1 tablet by mouth at bedtime. 1.5 tablets at night      fluocinonide (LIDEX) 0.05 % cream Apply  topically 2 times daily. Apply topically 2 times daily.      triamcinolone (KENALOG) 0.1 % cream Apply  topically daily. Apply topically 2 times daily.      alclomethasone (ACLOVATE) 0.05 % cream Apply  topically daily. Apply topically 2 times daily.      Ciclopirox 1 % SHAM Apply  topically.      hydrALAZINE (APRESOLINE) 25 MG tablet TAKE 1 TABLET BY MOUTH TWICE DAILY WITH FOOD (Patient not taking: Reported on 7/6/2023)       No current facility-administered medications for this visit.        Laboratory & Diagnostics:  CBC:   Lab Results   Component Value Date    WBC 7.12 12/19/2024    HGB 12.2 (A) 12/19/2024    HCT 37.7 (A) 12/19/2024     12/19/2024     BMP:    Lab Results   Component Value Date     12/19/2024     12/26/2023     05/01/2023    K 4.6 12/19/2024    K 4.3 12/26/2023    K 4.8 05/01/2023     12/19/2024     12/26/2023     05/01/2023    CO2 28 12/19/2024    CO2 24 12/26/2023    CO2 28 05/01/2023    BUN 28 12/19/2024    BUN 22 12/26/2023    BUN 24 05/01/2023    CREATININE 1.70 12/19/2024    CREATININE 1.50 12/26/2023    CREATININE 1.70 05/01/2023